# Patient Record
Sex: FEMALE | Race: WHITE | URBAN - METROPOLITAN AREA
[De-identification: names, ages, dates, MRNs, and addresses within clinical notes are randomized per-mention and may not be internally consistent; named-entity substitution may affect disease eponyms.]

---

## 2018-05-29 ENCOUNTER — HOSPITAL ENCOUNTER (OUTPATIENT)
Facility: CLINIC | Age: 63
Discharge: HOME | End: 2018-05-29
Attending: COLON & RECTAL SURGERY | Admitting: COLON & RECTAL SURGERY
Payer: COMMERCIAL

## 2018-05-29 VITALS
HEART RATE: 81 BPM | HEIGHT: 65 IN | WEIGHT: 185 LBS | RESPIRATION RATE: 12 BRPM | BODY MASS INDEX: 30.82 KG/M2 | SYSTOLIC BLOOD PRESSURE: 126 MMHG | OXYGEN SATURATION: 96 % | DIASTOLIC BLOOD PRESSURE: 105 MMHG

## 2018-05-29 LAB — COLONOSCOPY: NORMAL

## 2018-05-29 PROCEDURE — 88305 TISSUE EXAM BY PATHOLOGIST: CPT | Mod: 26 | Performed by: COLON & RECTAL SURGERY

## 2018-05-29 PROCEDURE — 45385 COLONOSCOPY W/LESION REMOVAL: CPT | Performed by: COLON & RECTAL SURGERY

## 2018-05-29 PROCEDURE — 88305 TISSUE EXAM BY PATHOLOGIST: CPT | Performed by: COLON & RECTAL SURGERY

## 2018-05-29 PROCEDURE — 25000128 H RX IP 250 OP 636: Performed by: COLON & RECTAL SURGERY

## 2018-05-29 PROCEDURE — 99153 MOD SED SAME PHYS/QHP EA: CPT | Performed by: COLON & RECTAL SURGERY

## 2018-05-29 PROCEDURE — G0500 MOD SEDAT ENDO SERVICE >5YRS: HCPCS | Performed by: COLON & RECTAL SURGERY

## 2018-05-29 RX ORDER — FLUMAZENIL 0.1 MG/ML
0.2 INJECTION, SOLUTION INTRAVENOUS
Status: DISCONTINUED | OUTPATIENT
Start: 2018-05-29 | End: 2018-05-29 | Stop reason: HOSPADM

## 2018-05-29 RX ORDER — LIDOCAINE 40 MG/G
CREAM TOPICAL
Status: DISCONTINUED | OUTPATIENT
Start: 2018-05-29 | End: 2018-05-29 | Stop reason: HOSPADM

## 2018-05-29 RX ORDER — FENTANYL CITRATE 50 UG/ML
INJECTION, SOLUTION INTRAMUSCULAR; INTRAVENOUS PRN
Status: DISCONTINUED | OUTPATIENT
Start: 2018-05-29 | End: 2018-05-29 | Stop reason: HOSPADM

## 2018-05-29 RX ORDER — ONDANSETRON 2 MG/ML
4 INJECTION INTRAMUSCULAR; INTRAVENOUS
Status: DISCONTINUED | OUTPATIENT
Start: 2018-05-29 | End: 2018-05-29 | Stop reason: HOSPADM

## 2018-05-29 RX ORDER — NALOXONE HYDROCHLORIDE 0.4 MG/ML
.1-.4 INJECTION, SOLUTION INTRAMUSCULAR; INTRAVENOUS; SUBCUTANEOUS
Status: DISCONTINUED | OUTPATIENT
Start: 2018-05-29 | End: 2018-05-29 | Stop reason: HOSPADM

## 2018-05-29 RX ORDER — ONDANSETRON 4 MG/1
4 TABLET, ORALLY DISINTEGRATING ORAL EVERY 6 HOURS PRN
Status: DISCONTINUED | OUTPATIENT
Start: 2018-05-29 | End: 2018-05-29 | Stop reason: HOSPADM

## 2018-05-29 RX ORDER — ONDANSETRON 2 MG/ML
4 INJECTION INTRAMUSCULAR; INTRAVENOUS EVERY 6 HOURS PRN
Status: DISCONTINUED | OUTPATIENT
Start: 2018-05-29 | End: 2018-05-29 | Stop reason: HOSPADM

## 2018-05-29 NOTE — BRIEF OP NOTE
[facility] Brief Operative Note    Pre-operative diagnosis: FAMILY HISTORY OF COLON POLYPS,PERSONAL HISTORY OF COLON POLYPS   Post-operative diagnosis colon polyps     Procedure: Procedure(s):  COLONOSCOPY - Wound Class: II-Clean Contaminated   Surgeon(s): Surgeon(s) and Role:     * Aline Knight MD - Primary   Estimated blood loss: * No values recorded between 5/29/2018 12:00 AM and 5/29/2018 12:35 PM *    Specimens:   ID Type Source Tests Collected by Time Destination   A : hot snare Polyp Large Intestine, Right/Ascending SURGICAL PATHOLOGY EXAM Amy Almendarez RN 5/29/2018 12:14 PM    B : hot snare Polyp Large Intestine, Sigmoid SURGICAL PATHOLOGY EXAM Amy Almendarez RN 5/29/2018 12:31 PM       Findings: See Provation procedure note in Epic

## 2018-05-30 LAB — COPATH REPORT: NORMAL

## 2019-05-29 ENCOUNTER — HOSPITAL ENCOUNTER (OUTPATIENT)
Facility: CLINIC | Age: 64
Discharge: HOME | End: 2019-05-29
Attending: COLON & RECTAL SURGERY | Admitting: COLON & RECTAL SURGERY
Payer: COMMERCIAL

## 2019-05-29 VITALS
BODY MASS INDEX: 31.65 KG/M2 | DIASTOLIC BLOOD PRESSURE: 90 MMHG | SYSTOLIC BLOOD PRESSURE: 129 MMHG | WEIGHT: 190 LBS | HEIGHT: 65 IN | RESPIRATION RATE: 14 BRPM | HEART RATE: 65 BPM | OXYGEN SATURATION: 96 %

## 2019-05-29 PROBLEM — C43.70: Status: ACTIVE | Noted: 2017-11-02

## 2019-05-29 PROBLEM — D68.51 FACTOR V LEIDEN MUTATION (H): Status: ACTIVE | Noted: 2017-11-02

## 2019-05-29 LAB — COLONOSCOPY: NORMAL

## 2019-05-29 PROCEDURE — 88305 TISSUE EXAM BY PATHOLOGIST: CPT | Performed by: COLON & RECTAL SURGERY

## 2019-05-29 PROCEDURE — 45380 COLONOSCOPY AND BIOPSY: CPT | Performed by: COLON & RECTAL SURGERY

## 2019-05-29 PROCEDURE — G0500 MOD SEDAT ENDO SERVICE >5YRS: HCPCS | Performed by: COLON & RECTAL SURGERY

## 2019-05-29 PROCEDURE — 25000128 H RX IP 250 OP 636: Performed by: COLON & RECTAL SURGERY

## 2019-05-29 PROCEDURE — 88305 TISSUE EXAM BY PATHOLOGIST: CPT | Mod: 26 | Performed by: COLON & RECTAL SURGERY

## 2019-05-29 PROCEDURE — 99153 MOD SED SAME PHYS/QHP EA: CPT

## 2019-05-29 RX ORDER — ONDANSETRON 2 MG/ML
4 INJECTION INTRAMUSCULAR; INTRAVENOUS EVERY 6 HOURS PRN
Status: DISCONTINUED | OUTPATIENT
Start: 2019-05-29 | End: 2019-05-29 | Stop reason: HOSPADM

## 2019-05-29 RX ORDER — ONDANSETRON 4 MG/1
4 TABLET, ORALLY DISINTEGRATING ORAL EVERY 6 HOURS PRN
Status: DISCONTINUED | OUTPATIENT
Start: 2019-05-29 | End: 2019-05-29 | Stop reason: HOSPADM

## 2019-05-29 RX ORDER — ONDANSETRON 2 MG/ML
4 INJECTION INTRAMUSCULAR; INTRAVENOUS
Status: DISCONTINUED | OUTPATIENT
Start: 2019-05-29 | End: 2019-05-29 | Stop reason: HOSPADM

## 2019-05-29 RX ORDER — FENTANYL CITRATE 50 UG/ML
INJECTION, SOLUTION INTRAMUSCULAR; INTRAVENOUS PRN
Status: DISCONTINUED | OUTPATIENT
Start: 2019-05-29 | End: 2019-05-29 | Stop reason: HOSPADM

## 2019-05-29 RX ORDER — FLUMAZENIL 0.1 MG/ML
0.2 INJECTION, SOLUTION INTRAVENOUS
Status: DISCONTINUED | OUTPATIENT
Start: 2019-05-29 | End: 2019-05-29 | Stop reason: HOSPADM

## 2019-05-29 RX ORDER — LIDOCAINE 40 MG/G
CREAM TOPICAL
Status: DISCONTINUED | OUTPATIENT
Start: 2019-05-29 | End: 2019-05-29 | Stop reason: HOSPADM

## 2019-05-29 RX ORDER — NALOXONE HYDROCHLORIDE 0.4 MG/ML
.1-.4 INJECTION, SOLUTION INTRAMUSCULAR; INTRAVENOUS; SUBCUTANEOUS
Status: DISCONTINUED | OUTPATIENT
Start: 2019-05-29 | End: 2019-05-29 | Stop reason: HOSPADM

## 2019-05-29 ASSESSMENT — MIFFLIN-ST. JEOR: SCORE: 1407.71

## 2019-05-29 NOTE — H&P
Pre-Endoscopy History and Physical     Felicia Gonzales MRN# [id number]   YOB: 1955 Age: 63 year old     Date of Procedure: 5/29/2019  Primary care provider: Terry Enriquez  Type of Endoscopy: colonoscopy  Reason for Procedure: screening, history of polyps  Type of Anesthesia Anticipated: moderate sedation    HPI:    Felicia is a 63 year old female who will be undergoing the above procedure.  Patient had a 25 mm SSA removed during a colonoscopy last year. Patient denies a change in her bowel habits or bleeding.     A history and physical has been performed. The patient's medications and allergies have been reviewed. The risks and benefits of the procedure and the sedation options and risks were discussed with the patient.  All questions were answered and informed consent was obtained.      She denies a personal or family history of anesthesia complications or bleeding disorders.   Prior to Admission medications    Medication Sig Start Date End Date Taking? Authorizing Provider   DiphenhydrAMINE HCl (BENADRYL PO) Take  by mouth.   Yes Reported, Patient   Omeprazole (PRILOSEC PO) Take 20 mg by mouth every morning.   Yes Reported, Patient   ASPIRIN PO Take 81 mg by mouth.    Reported, Patient   CITALOPRAM HYDROBROMIDE PO     Reported, Patient   DICLOFENAC SODIUM PO Take 75 mg by mouth.    Reported, Patient   loratadine (CLARITIN) 10 MG tablet Take 10 mg by mouth daily.    Reported, Patient   MetroNIDAZOLE (FLAGYL PO) Take 500 mg by mouth as needed.    Reported, Patient   multivitamin, therapeutic with minerals (MULTI-VITAMIN) TABS Take 1 tablet by mouth daily.    Reported, Patient       Allergies   Allergen Reactions     No Clinical Screening - See Comments      PN: LW Other1: -NKA  PN: CHANTAL CM1: CONTRAST- NKA Reaction :     Penicillins Hives     Sulfa Drugs Hives        No current facility-administered medications for this encounter.        Patient Active Problem List   Diagnosis     Factor V  "Leiden mutation (H)     Malignant melanoma of lower limb (H)        Past Medical History:   Diagnosis Date     Arthritis      Factor 5 Leiden mutation, heterozygous (H)      Foot fracture, right         Past Surgical History:   Procedure Laterality Date     ABDOMEN SURGERY      cyst drained on right inguial area     COLONOSCOPY      had polyps first exam     COLONOSCOPY  4/17/2013    Procedure: COLONOSCOPY;  COLONOSCOPY;  Surgeon: Aline Knight MD;  Location:  GI     GYN SURGERY      vag hyst     ORTHOPEDIC SURGERY      right knee replaced       Social History     Tobacco Use     Smoking status: Never Smoker     Smokeless tobacco: Never Used   Substance Use Topics     Alcohol use: Yes     Comment: social use       Family History   Problem Relation Age of Onset     Colon Cancer Mother        REVIEW OF SYSTEMS:     5 point ROS negative except as noted above in HPI, including Gen., Resp., CV, GI &  system review.      PHYSICAL EXAM:   /88   Pulse 65   Resp 16   Ht 1.651 m (5' 5\")   Wt 86.2 kg (190 lb)   BMI 31.62 kg/m   Estimated body mass index is 31.62 kg/m  as calculated from the following:    Height as of this encounter: 1.651 m (5' 5\").    Weight as of this encounter: 86.2 kg (190 lb).   GENERAL APPEARANCE: healthy  MENTAL STATUS: alert  AIRWAY EXAM: Mallampatti Class II (visualization of the soft palate, fauces, and uvula)  RESP: lungs clear to auscultation - no rales, rhonchi or wheezes  CV: regular rates and rhythm      DIAGNOSTICS:    Not indicated      IMPRESSION   ASA Class 1 - Healthy patient, no medical problems        PLAN:       Colonoscopy with possible polypectomy, possible biopsy. The indications, procedure and risks were explained to the patient who agrees to proceed.       The above has been forwarded to the consulting provider.      Signed Electronically by: Aline Knight  May 29, 2019        "

## 2019-05-29 NOTE — BRIEF OP NOTE
[facility]    Brief Operative Note    Pre-operative diagnosis: PERSONAL HISTORY OF POLYPS  Post-operative diagnosis rectal polyp  Procedure: Procedure(s):  COLONOSCOPY, WITH POLYPECTOMY AND BIOPSY  Surgeon: Surgeon(s) and Role:     * Aline Knight MD - Primary  Anesthesia: Conscious Sedation   Estimated blood loss: Minimal  Drains: None  Specimens: * No specimens in log *  Findings:   See Provation procedure note in Epic    Complications: None.  Implants:  * No implants in log *

## 2019-05-31 LAB — COPATH REPORT: NORMAL

## 2025-01-17 NOTE — H&P
Pre-Endoscopy History and Physical     Felicia Gonzales MRN# [id number]   YOB: 1955 Age: 62 year old     Date of Procedure: 5/29/2018  Primary care provider: Terry Enriquez  Type of Endoscopy: colonoscopy  Reason for Procedure: screening  Type of Anesthesia Anticipated: moderate sedation    HPI:    Felicia is a 62 year old female who will be undergoing the above procedure.  Patient denies a change in her bowel habits or bleeding.  Patient has had adenomatous polyps in the past.    A history and physical has been performed. The patient's medications and allergies have been reviewed. The risks and benefits of the procedure and the sedation options and risks were discussed with the patient.  All questions were answered and informed consent was obtained.      She denies a personal or family history of anesthesia complications or bleeding disorders.   Prior to Admission medications    Medication Sig Start Date End Date Taking? Authorizing Provider   CITALOPRAM HYDROBROMIDE PO    Yes Reported, Patient   Omeprazole (PRILOSEC PO) Take 20 mg by mouth every morning.   Yes Reported, Patient   ASPIRIN PO Take 81 mg by mouth.    Reported, Patient   DICLOFENAC SODIUM PO Take 75 mg by mouth.    Reported, Patient   DiphenhydrAMINE HCl (BENADRYL PO) Take  by mouth.    Reported, Patient   loratadine (CLARITIN) 10 MG tablet Take 10 mg by mouth daily.    Reported, Patient   MetroNIDAZOLE (FLAGYL PO) Take 500 mg by mouth as needed.    Reported, Patient   multivitamin, therapeutic with minerals (MULTI-VITAMIN) TABS Take 1 tablet by mouth daily.    Reported, Patient       Allergies   Allergen Reactions     Penicillins Hives     Sulfa Drugs Hives        Current Facility-Administered Medications   Medication     lidocaine (LMX4) cream     lidocaine 1 % 1 mL     ondansetron (ZOFRAN) injection 4 mg     sodium chloride (PF) 0.9% PF flush 3 mL     sodium chloride (PF) 0.9% PF flush 3 mL       There is no problem list  "on file for this patient.       Past Medical History:   Diagnosis Date     Arthritis      Factor 5 Leiden mutation, heterozygous (H)      Foot fracture, right         Past Surgical History:   Procedure Laterality Date     ABDOMEN SURGERY      cyst drained on right inguial area     COLONOSCOPY      had polyps first exam     COLONOSCOPY  4/17/2013    Procedure: COLONOSCOPY;  COLONOSCOPY;  Surgeon: Aline Knight MD;  Location:  GI     GYN SURGERY      vag hyst     ORTHOPEDIC SURGERY      right knee replaced       Social History   Substance Use Topics     Smoking status: Never Smoker     Smokeless tobacco: Never Used     Alcohol use Yes      Comment: social use       Family History   Problem Relation Age of Onset     Colon Cancer Mother        REVIEW OF SYSTEMS:     5 point ROS negative except as noted above in HPI, including Gen., Resp., CV, GI &  system review.      PHYSICAL EXAM:   /88  Pulse 81  Resp 18  Ht 1.651 m (5' 5\")  Wt 83.9 kg (185 lb)  SpO2 96%  BMI 30.79 kg/m2 Estimated body mass index is 30.79 kg/(m^2) as calculated from the following:    Height as of this encounter: 1.651 m (5' 5\").    Weight as of this encounter: 83.9 kg (185 lb).   GENERAL APPEARANCE: healthy  MENTAL STATUS: alert  AIRWAY EXAM: Mallampatti Class II (visualization of the soft palate, fauces, and uvula)  RESP: lungs clear to auscultation - no rales, rhonchi or wheezes  CV: regular rates and rhythm      DIAGNOSTICS:    Not indicated      IMPRESSION   ASA Class 2 - Mild systemic disease        PLAN:       Colonoscopy with possible polypectomy, possible biopsy. The indications, procedure and risks were explained to the patient who agrees to proceed.       The above has been forwarded to the consulting provider.      Signed Electronically by: Aline Knight  May 29, 2018          " hard copy, drawn during this pregnancy

## (undated) RX ORDER — FENTANYL CITRATE 50 UG/ML
INJECTION, SOLUTION INTRAMUSCULAR; INTRAVENOUS
Status: DISPENSED
Start: 2018-05-29

## (undated) RX ORDER — FENTANYL CITRATE 50 UG/ML
INJECTION, SOLUTION INTRAMUSCULAR; INTRAVENOUS
Status: DISPENSED
Start: 2019-05-29